# Patient Record
(demographics unavailable — no encounter records)

---

## 2025-05-14 NOTE — ASSESSMENT
[FreeTextEntry1] : 70 year old female with osteoporosis, bronchiectasis, NTM - Check sputum culture - Repeat CT chest to follow up nodule - Advised to be consistent with airway clearance - Refills sent to pharmacy.  Follow up in 6 months, sooner if needed.

## 2025-05-14 NOTE — REASON FOR VISIT
[Initial] : an initial visit [Bronchiectasis] : bronchiectasis [Pulmonary Nodules] : pulmonary nodules [TextBox_44] : NTM

## 2025-05-14 NOTE — PHYSICAL EXAM
[No Acute Distress] : no acute distress [Normal Appearance] : normal appearance [Normal Rate/Rhythm] : normal rate/rhythm [Normal S1, S2] : normal s1, s2 [No Murmurs] : no murmurs [No Resp Distress] : no resp distress [No Abnormalities] : no abnormalities [Normal Gait] : normal gait [No Clubbing] : no clubbing [No Edema] : no edema [No Focal Deficits] : no focal deficits [Oriented x3] : oriented x3 [Normal Affect] : normal affect

## 2025-05-14 NOTE — HISTORY OF PRESENT ILLNESS
[Never] : never [TextBox_4] : 70 year old female with osteoporosis, bronchiectasis, NTM here to establish care.  Former patient of Dr. Nasima Erazo. Never smoker.  In 2019, patient presented with cough and occasional hemoptysis. CT chest showed small cavity and RLL bronchiolitis. Sputum AFB negative at that time. Had recurrent episodes of small-volume hemoptysis in 2022. s/p bronchoscopy 3/30/2022 grew M. Gordonae and started on treatment 4/26/22 for 6 months. Patient reports feeling extremely weak with treatment and was stopped after 6 months. Subsequent imaging showed resolution of cavities but latest CT from 2024 showed new LLL nodule. She denies shortness of breath. Has occasional cough and sputum production. Occasional sputum admixed with blood, particularly with URIs. Weight has been stable. She does not use her nebulizer regularly.   CT chest (5/20/2024) New LLL 4mm sub-solid nodule. RLL nodularity/scarring/architectural distortion.

## 2025-05-14 NOTE — REVIEW OF SYSTEMS
[Cough] : cough [Hemoptysis] : hemoptysis [Sputum] : sputum [Dyspnea] : no dyspnea [SOB on Exertion] : no sob on exertion